# Patient Record
Sex: FEMALE | Race: BLACK OR AFRICAN AMERICAN
[De-identification: names, ages, dates, MRNs, and addresses within clinical notes are randomized per-mention and may not be internally consistent; named-entity substitution may affect disease eponyms.]

---

## 2021-01-10 ENCOUNTER — HOSPITAL ENCOUNTER (EMERGENCY)
Dept: HOSPITAL 65 - ER | Age: 46
Discharge: HOME | End: 2021-01-10
Payer: COMMERCIAL

## 2021-01-10 VITALS — BODY MASS INDEX: 25.76 KG/M2 | WEIGHT: 140 LBS | HEIGHT: 62 IN

## 2021-01-10 VITALS — SYSTOLIC BLOOD PRESSURE: 140 MMHG

## 2021-01-10 DIAGNOSIS — Z88.5: ICD-10-CM

## 2021-01-10 DIAGNOSIS — J01.90: Primary | ICD-10-CM

## 2021-01-10 PROCEDURE — 99283 EMERGENCY DEPT VISIT LOW MDM: CPT

## 2021-01-10 NOTE — ER.PDOC
General


Chief Complaint:  SINUS INFECTION


Stated Complaint:  SPITTING UP BLOOD FROM SINUS INFECTION


TRAVEL OUT OF US:  No


Time seen by MD:  00:33


Source:  patient


Exam Limitations:  no limitations





History of Present Illness


Initial Comments


Facial pain for 3 weeks, she has has sinusitis in the past. When she drags her 

nose and spits out, she sees blood occasionally. No vomiting or coughing out 

blood.


Severity:  mild


Associated Symptoms:  denies symptoms


Allergies:  


Coded Allergies:  


     hydrocodone (Verified  Allergy, Intermediate, RASH, 1/22/16)





Past Medical History


Medical History:  no pertinent history


Surgical History:  no surgical history





Social History


Alcohol Use:  none


Drug Use:  none





Review of Systems


Constitutional:  no symptoms reported


EENTM:  nose congestion


Respiratory:  no symptoms reported


Cardiovascular:  no symptoms reported


Gastrointestinal:  no symptoms reported


Genitourinary:  no symptoms reported


All Other Systems:  Reviewed and Negative





Physical Exam


General Appearance:  No Apparent Distress, WD/WN


EENT:  tenderness (maxillary sinuses)


Neck:  Non-Tender, Full Range of Motion, Supple


Respiratory:  chest non-tender, lungs clear, normal breath sounds, no 

respiratory distress, no accessory muscle use


CVS:  reg rate & rhythm, no murmur, no gallop, pulses nml, nml capillary refill


Gastrointestinal:  Normal Bowel Sounds, No Organomegaly, No Pulsatile Mass, Non 

Tender


Back:  Normal Inspection, No CVA Tenderness, No Vertebral Tenderness


Extremities:  Normal Range of Motion, Non-Tender, Normal Inspection, No Pedal 

Edema


Neurologic/Psychiatric:  CNs II-XII NML as Tested, No Motor/Sensory Deficits, 

Alert, Normal Mood/Affect, Oriented x 3


Skin:  Normal Color





Results/Orders


Results/Orders





Vital Signs








  Date Time  Temp Pulse Resp B/P (MAP) Pulse Ox O2 Delivery O2 Flow Rate FiO2


 


1/10/21 00:26 98.7 92 18  99   


 


1/10/21 00:26 98.7 92 18  99   


 


1/10/21 00:26 98.7 92 18     











Progress


Progress


I offered to do CBC and BMP but patient declined.





ER DEPART


Departure


Time of Disposition:  00:38


Disposition:  01 HOME, SELF-CARE


Impression:  


   Primary Impression:  


   Acute sinusitis


Condition:  Stable


Referrals:  


PCP,UNKNOWN (PCP)


PRIMARY CARE PROVIDER





Additional Instructions:  


Augmentin


F/U with PCP in 5-7 days


Return to ED if worsening or concerns


Duration or Time Spent with Pa:  10 min





Problem Qualifiers








   Primary Impression:  


   Acute sinusitis


   Sinusitis location:  maxillary  Recurrence:  not specified as recurrent  

   Qualified Codes:  J01.00 - Acute maxillary sinusitis, unspecified








ELEN BRUNSON MD                [place] 10, 2021 00:39